# Patient Record
Sex: FEMALE | Race: WHITE | NOT HISPANIC OR LATINO
[De-identification: names, ages, dates, MRNs, and addresses within clinical notes are randomized per-mention and may not be internally consistent; named-entity substitution may affect disease eponyms.]

---

## 2020-12-03 DIAGNOSIS — Z00.00 ENCOUNTER FOR GENERAL ADULT MEDICAL EXAMINATION W/OUT ABNORMAL FINDINGS: ICD-10-CM

## 2020-12-03 DIAGNOSIS — Z01.818 ENCOUNTER FOR OTHER PREPROCEDURAL EXAMINATION: ICD-10-CM

## 2020-12-04 ENCOUNTER — APPOINTMENT (OUTPATIENT)
Dept: DISASTER EMERGENCY | Facility: CLINIC | Age: 85
End: 2020-12-04

## 2020-12-05 LAB — SARS-COV-2 N GENE NPH QL NAA+PROBE: NOT DETECTED

## 2020-12-07 ENCOUNTER — APPOINTMENT (OUTPATIENT)
Dept: PULMONOLOGY | Facility: CLINIC | Age: 85
End: 2020-12-07
Payer: MEDICARE

## 2020-12-07 VITALS
SYSTOLIC BLOOD PRESSURE: 165 MMHG | WEIGHT: 151 LBS | HEART RATE: 58 BPM | DIASTOLIC BLOOD PRESSURE: 64 MMHG | OXYGEN SATURATION: 99 % | BODY MASS INDEX: 28.51 KG/M2 | HEIGHT: 61 IN | TEMPERATURE: 98.2 F

## 2020-12-07 DIAGNOSIS — Z87.39 PERSONAL HISTORY OF OTHER DISEASES OF THE MUSCULOSKELETAL SYSTEM AND CONNECTIVE TISSUE: ICD-10-CM

## 2020-12-07 DIAGNOSIS — M35.3 POLYMYALGIA RHEUMATICA: ICD-10-CM

## 2020-12-07 DIAGNOSIS — K22.70 BARRETT'S ESOPHAGUS W/OUT DYSPLASIA: ICD-10-CM

## 2020-12-07 DIAGNOSIS — Z86.59 PERSONAL HISTORY OF OTHER MENTAL AND BEHAVIORAL DISORDERS: ICD-10-CM

## 2020-12-07 DIAGNOSIS — J30.2 OTHER SEASONAL ALLERGIC RHINITIS: ICD-10-CM

## 2020-12-07 DIAGNOSIS — R05 COUGH: ICD-10-CM

## 2020-12-07 DIAGNOSIS — B02.23 POSTHERPETIC POLYNEUROPATHY: ICD-10-CM

## 2020-12-07 DIAGNOSIS — E11.49 TYPE 2 DIABETES MELLITUS WITH OTHER DIABETIC NEUROLOGICAL COMPLICATION: ICD-10-CM

## 2020-12-07 DIAGNOSIS — Z86.79 PERSONAL HISTORY OF OTHER DISEASES OF THE CIRCULATORY SYSTEM: ICD-10-CM

## 2020-12-07 DIAGNOSIS — M65.28 CALCIFIC TENDINITIS, OTHER SITE: ICD-10-CM

## 2020-12-07 DIAGNOSIS — K31.84 GASTROPARESIS: ICD-10-CM

## 2020-12-07 DIAGNOSIS — I25.10 ATHEROSCLEROTIC HEART DISEASE OF NATIVE CORONARY ARTERY W/OUT ANGINA PECTORIS: ICD-10-CM

## 2020-12-07 DIAGNOSIS — E73.9 LACTOSE INTOLERANCE, UNSPECIFIED: ICD-10-CM

## 2020-12-07 DIAGNOSIS — S06.6X0A TRAUMATIC SUBARACHNOID HEMORRHAGE W/OUT LOSS OF CONSCIOUSNESS, INITIAL ENCOUNTER: ICD-10-CM

## 2020-12-07 DIAGNOSIS — H81.4 VERTIGO OF CENTRAL ORIGIN: ICD-10-CM

## 2020-12-07 DIAGNOSIS — Z86.2 PERSONAL HISTORY OF DISEASES OF THE BLOOD AND BLOOD-FORMING ORGANS AND CERTAIN DISORDERS INVOLVING THE IMMUNE MECHANISM: ICD-10-CM

## 2020-12-07 DIAGNOSIS — R06.02 SHORTNESS OF BREATH: ICD-10-CM

## 2020-12-07 DIAGNOSIS — G51.0 BELL'S PALSY: ICD-10-CM

## 2020-12-07 DIAGNOSIS — E78.5 HYPERLIPIDEMIA, UNSPECIFIED: ICD-10-CM

## 2020-12-07 DIAGNOSIS — K21.9 GASTRO-ESOPHAGEAL REFLUX DISEASE W/OUT ESOPHAGITIS: ICD-10-CM

## 2020-12-07 LAB — POCT - HEMOGLOBIN (HGB), QUANTITATIVE, TRANSCUTANEOUS: 9.3

## 2020-12-07 PROCEDURE — 94010 BREATHING CAPACITY TEST: CPT

## 2020-12-07 PROCEDURE — 99204 OFFICE O/P NEW MOD 45 MIN: CPT | Mod: 25

## 2020-12-07 PROCEDURE — 94727 GAS DIL/WSHOT DETER LNG VOL: CPT

## 2020-12-07 PROCEDURE — 88738 HGB QUANT TRANSCUTANEOUS: CPT

## 2020-12-07 PROCEDURE — ZZZZZ: CPT

## 2020-12-07 PROCEDURE — 94729 DIFFUSING CAPACITY: CPT

## 2020-12-07 RX ORDER — LEVOFLOXACIN 250 MG/1
250 TABLET, FILM COATED ORAL
Qty: 3 | Refills: 0 | Status: COMPLETED | COMMUNITY
Start: 2020-11-23

## 2020-12-07 RX ORDER — DICLOFENAC SODIUM 1% 10 MG/G
1 GEL TOPICAL
Qty: 300 | Refills: 0 | Status: DISCONTINUED | COMMUNITY
Start: 2020-11-11

## 2020-12-07 RX ORDER — LINAGLIPTIN 5 MG/1
5 TABLET, FILM COATED ORAL
Refills: 0 | Status: ACTIVE | COMMUNITY
Start: 2020-12-07

## 2020-12-07 RX ORDER — CLOPIDOGREL BISULFATE 75 MG/1
75 TABLET, FILM COATED ORAL
Refills: 0 | Status: ACTIVE | COMMUNITY
Start: 2020-12-07

## 2020-12-07 RX ORDER — SERTRALINE HYDROCHLORIDE 100 MG/1
100 TABLET, FILM COATED ORAL
Refills: 0 | Status: ACTIVE | COMMUNITY
Start: 2020-12-07

## 2020-12-07 RX ORDER — AMOXICILLIN AND CLAVULANATE POTASSIUM 875; 125 MG/1; MG/1
875-125 TABLET, COATED ORAL
Qty: 28 | Refills: 0 | Status: DISCONTINUED | COMMUNITY
Start: 2020-11-25

## 2020-12-07 RX ORDER — FOLIC ACID 1 MG/1
1 TABLET ORAL
Refills: 0 | Status: ACTIVE | COMMUNITY
Start: 2020-12-07

## 2020-12-07 RX ORDER — ROSUVASTATIN CALCIUM 10 MG/1
10 TABLET, FILM COATED ORAL
Refills: 0 | Status: ACTIVE | COMMUNITY
Start: 2020-12-07

## 2020-12-07 RX ORDER — FAMOTIDINE 20 MG/1
20 TABLET, FILM COATED ORAL
Refills: 0 | Status: ACTIVE | COMMUNITY
Start: 2020-12-07

## 2020-12-07 RX ORDER — OLMESARTAN MEDOXOMIL 5 MG/1
5 TABLET, FILM COATED ORAL
Refills: 0 | Status: ACTIVE | COMMUNITY
Start: 2020-12-07

## 2020-12-07 RX ORDER — TRIAMCINOLONE ACETONIDE 1 MG/G
0.1 CREAM TOPICAL
Qty: 60 | Refills: 0 | Status: DISCONTINUED | COMMUNITY
Start: 2020-08-18

## 2020-12-07 RX ORDER — NEBIVOLOL HYDROCHLORIDE 10 MG/1
10 TABLET ORAL
Refills: 0 | Status: ACTIVE | COMMUNITY
Start: 2020-12-07

## 2020-12-07 RX ORDER — LEVOCETIRIZINE DIHYDROCHLORIDE 5 MG/1
5 TABLET ORAL
Refills: 0 | Status: ACTIVE | COMMUNITY
Start: 2020-12-07

## 2020-12-07 RX ORDER — DENOSUMAB 60 MG/ML
60 INJECTION SUBCUTANEOUS
Refills: 0 | Status: ACTIVE | COMMUNITY
Start: 2020-12-07

## 2020-12-07 NOTE — HISTORY OF PRESENT ILLNESS
[TextBox_4] : Patient referred for evaluation of exertional chest discomfort and shortness of breath.  She has a longstanding history of dyspnea which is worsened over the past few months.  She saw her cardiologist and echocardiogram revealed mild pulmonary hypertension.  She does report some degree of cough and congestion.  She has a history of chronic anemia.  She has been treated for asthma in the past.  She does not report recent cough or wheeze.  She was recently hospitalized for complications of a urinary tract infection.  Chest imaging done during the hospital was negative.  sHe does have a history of obstructive sleep apnea.\par sHe recently had an oral appliance made and is pending a sleep study to assess its effectiveness.\par \par sHe reports frequent heartburn\par \par

## 2020-12-07 NOTE — ASSESSMENT
[FreeTextEntry1] : Shortness of breath appears to be multifactorial.\par Anemia deconditioning recent hospitalization.  Agree with cardiac catheterization as no apparent pulmonary disease.  Need to assess adequacy of sleep apnea treatment.  Will arrange multinight home sleep study for both repeat diagnosis and assessment of oral appliance efficacy.\par Cough likely secondary to acid reflux disease.  Should have reassessment after cardiac catheterization

## 2020-12-07 NOTE — PHYSICAL EXAM
[No Acute Distress] : no acute distress [Normal Appearance] : normal appearance [No Neck Mass] : no neck mass [Normal Rate/Rhythm] : normal rate/rhythm [Normal S1, S2] : normal s1, s2 [No Murmurs] : no murmurs [No Resp Distress] : no resp distress [Clear to Auscultation Bilaterally] : clear to auscultation bilaterally [No Abnormalities] : no abnormalities [Benign] : benign [Normal Gait] : normal gait [No Clubbing] : no clubbing [No Cyanosis] : no cyanosis [No Edema] : no edema [FROM] : FROM [Normal Color/ Pigmentation] : normal color/ pigmentation [No Focal Deficits] : no focal deficits [Oriented x3] : oriented x3 [Normal Affect] : normal affect [TextBox_11] : dry throat elongated uvula

## 2021-01-19 ENCOUNTER — APPOINTMENT (OUTPATIENT)
Dept: PULMONOLOGY | Facility: CLINIC | Age: 86
End: 2021-01-19
Payer: MEDICARE

## 2021-01-19 PROCEDURE — 95800 SLP STDY UNATTENDED: CPT

## 2021-01-21 PROCEDURE — 95800 SLP STDY UNATTENDED: CPT

## 2021-01-22 PROCEDURE — 95800 SLP STDY UNATTENDED: CPT

## 2021-02-23 ENCOUNTER — APPOINTMENT (OUTPATIENT)
Dept: PULMONOLOGY | Facility: CLINIC | Age: 86
End: 2021-02-23
Payer: MEDICARE

## 2021-02-23 VITALS
TEMPERATURE: 98.2 F | HEART RATE: 55 BPM | DIASTOLIC BLOOD PRESSURE: 80 MMHG | SYSTOLIC BLOOD PRESSURE: 170 MMHG | OXYGEN SATURATION: 99 %

## 2021-02-23 DIAGNOSIS — M35.00 SICCA SYNDROME, UNSPECIFIED: ICD-10-CM

## 2021-02-23 PROCEDURE — 99214 OFFICE O/P EST MOD 30 MIN: CPT

## 2021-02-23 NOTE — HISTORY OF PRESENT ILLNESS
[TextBox_4] : Followup for sleep apnea\par Patient here to review results of home sleep study.\par No change in history as reported on initial evaluation\par Currently being treated with Ranexa for pulmonary hypertension shortness of breath.  History of Sjogren's syndrome and rheumatoid arthritis.

## 2021-02-23 NOTE — ASSESSMENT
[FreeTextEntry1] : Results of sleep study reviewed with patient. Treatment options including weight loss oral appliance therapy and PAP therapy were reviewed with patient. After careful review of sleep study patient desire and risk factors recommend initial therapy with PAP. Will order auto titrating device.\par \par It remains to be seen whether treatment of sleep apnea will have any effect on the pulmonary hypertension I suspect that it will not.  I have obtained additional serologies and I discussed the case with the patient's rheumatologist Dr. Lionel Bower.  If there is no decrease in the pulmonary pressures with use of CPAP will consider initiating treatment for autoimmune pulmonary hypertension.

## 2021-02-23 NOTE — PROCEDURE
[FreeTextEntry1] : Home sleep study demonstrates moderate DANK.  The patient informs me that he used an oral appliance for some of the nights of the study however there appears to be no difference with or without the appliance.  In comparison to her prior sleep study dated 2016 there has been a slight increase in the AHI and decrease in the oxygen desaturation index.

## 2021-02-24 LAB
ENA SS-A AB SER IA-ACNC: <0.2 AL
ENA SS-B AB SER IA-ACNC: <0.2 AL
RHEUMATOID FACT SER QL: <10 IU/ML

## 2021-02-25 LAB — ANACR T: NEGATIVE

## 2021-04-06 ENCOUNTER — APPOINTMENT (OUTPATIENT)
Dept: PULMONOLOGY | Facility: CLINIC | Age: 86
End: 2021-04-06

## 2021-04-12 ENCOUNTER — APPOINTMENT (OUTPATIENT)
Dept: PULMONOLOGY | Facility: CLINIC | Age: 86
End: 2021-04-12
Payer: MEDICARE

## 2021-04-12 VITALS
OXYGEN SATURATION: 99 % | TEMPERATURE: 98.7 F | RESPIRATION RATE: 16 BRPM | HEART RATE: 58 BPM | DIASTOLIC BLOOD PRESSURE: 57 MMHG | SYSTOLIC BLOOD PRESSURE: 135 MMHG

## 2021-04-12 PROCEDURE — 94617 EXERCISE TST BRNCSPSM W/ECG: CPT

## 2021-04-12 PROCEDURE — 99214 OFFICE O/P EST MOD 30 MIN: CPT | Mod: 25

## 2021-04-12 NOTE — REASON FOR VISIT
[Follow-Up] : a follow-up visit [Sleep Apnea] : sleep apnea [Pulmonary Hypertension] : pulmonary hypertension

## 2021-04-12 NOTE — HISTORY OF PRESENT ILLNESS
[Never] : never [TextBox_4] : Follow-up for sleep apnea and pulmonary hypertension.\par Currently on auto titrating CPAP has been using it for about a month she notes an improvement in level of dyspnea and a reduction in previously described palpitations with exertion she has no new complaints he denies cough or sputum production.  She reports no difficulty with CPAP use\par \par Here for DANK followup. Reports no current respiratory symptoms or sleep symptoms. Using PAP on a nightly basis without difficulty. Reports no symptoms of daytime sleepiness. Getting supplies regularly. \par \par Device: ResMed S 10 auto\par \par Vendor: [employer]\par \par Settin-15\par \par Mask: P 10 nasal\par \par Issues: None\par

## 2021-04-12 NOTE — ASSESSMENT
[FreeTextEntry1] : Patient is currently on treatment with PAP. Data download confirms excellent compliance. Patient continues to use treatment and is benefiting from it.\par It is yet to be determined if her pulmonary hypertension will improve with CPAP treatment she is due to see her cardiologist next month and would like her to have a follow-up echocardiogram in June.  If pulmonary hypertension does not improve may consider a drug therapy as she also has underlying rheumatoid arthritis and an autoimmune mechanism may be responsible.  I do need to review her cardiac catheterization as she was told that she had diastolic heart failure.  It may be difficult to make the differentiation in this case between pulmonary hypertension related to autoimmune disease sleep apnea and diastolic heart disease

## 2021-04-12 NOTE — PROCEDURE
[FreeTextEntry1] : CPAP usage days 30/30 days (100%)\par >= 4 hours 29 days (97%)\par < 4 hours 1 days (3%)\par Usage hours 223 hours 57 minutes\par Average usage (total days) 7 hours 28 minutes\par Average usage (days used) 7 hours 28 minutes\par Median usage (days used) 7 hours 42 minutes\par Total used hours (value since last reset - 04/11/2021) 285 hours\par AirSense 10 AutoSet\par Serial number [id number]\par Mode AutoSet\par Min Pressure 5 cmH2O\par Max Pressure 15 cmH2O\par EPR Off\par EPR level 1\par Response Standard\par Therapy\par Pressure - cmH2O Median: 10.7 95th percentile: 13.8 Maximum: 14.6\par Leaks - L/min Median: 30.4 95th percentile: 56.8 Maximum: 81.8\par Events per hour AI: 3.5 HI: 1.3 AHI: 4.8\par Apnea Index Central: 0.1 Obstructive: 0.5 Unknown: 2.9\par RERA Index 3.2\par \par Walking oximetry demonstrates no desaturation

## 2021-07-12 ENCOUNTER — APPOINTMENT (OUTPATIENT)
Dept: PULMONOLOGY | Facility: CLINIC | Age: 86
End: 2021-07-12
Payer: MEDICARE

## 2021-07-12 VITALS
DIASTOLIC BLOOD PRESSURE: 80 MMHG | HEART RATE: 56 BPM | OXYGEN SATURATION: 97 % | TEMPERATURE: 98 F | SYSTOLIC BLOOD PRESSURE: 150 MMHG

## 2021-07-12 PROCEDURE — 99214 OFFICE O/P EST MOD 30 MIN: CPT

## 2021-07-12 NOTE — ASSESSMENT
[FreeTextEntry1] : Patient is currently on treatment with PAP. Data download confirms excellent compliance. Patient continues to use treatment and is benefiting from it.\par Should have follow-up echocardiogram in reference to pulmonary hypertension now that she is on CPAP

## 2021-07-12 NOTE — HISTORY OF PRESENT ILLNESS
[TextBox_4] : Follow-up for sleep apnea and pulmonary hypertension.\par Using CPAP on a nightly basis with good clinical response and definite improvement in daytime alertness  describes it as a "miracle".  She reports improved exercise tolerance and improvement in daytime alertness although there are days that she is still sleepy during the daytime I looked at her data and she has some leakage issues and this may be part of the reason that her response has been somewhat uneven.  I talked about replacing her nasal pillow with a different kind of mask overall however she is doing very well.\par \par I reviewed her recent labs which showed elevated parathyroid level otherwise no significant abnormalities.

## 2021-10-22 ENCOUNTER — APPOINTMENT (OUTPATIENT)
Dept: PULMONOLOGY | Facility: CLINIC | Age: 86
End: 2021-10-22
Payer: MEDICARE

## 2021-10-22 VITALS — HEART RATE: 72 BPM | DIASTOLIC BLOOD PRESSURE: 72 MMHG | SYSTOLIC BLOOD PRESSURE: 148 MMHG | OXYGEN SATURATION: 98 %

## 2021-10-22 DIAGNOSIS — I27.20 PULMONARY HYPERTENSION, UNSPECIFIED: ICD-10-CM

## 2021-10-22 DIAGNOSIS — M06.9 RHEUMATOID ARTHRITIS, UNSPECIFIED: ICD-10-CM

## 2021-10-22 PROCEDURE — 99214 OFFICE O/P EST MOD 30 MIN: CPT

## 2021-10-23 PROBLEM — I27.20 PULMONARY HYPERTENSION: Status: ACTIVE | Noted: 2021-02-23

## 2021-10-23 PROBLEM — M06.9 RHEUMATOID ARTHRITIS: Status: ACTIVE | Noted: 2021-02-23

## 2021-10-23 NOTE — PROCEDURE
[FreeTextEntry1] : CPAP compliance data demonstrates excellent compliance nightly usage 6 hours AHI 4.5

## 2021-10-23 NOTE — HISTORY OF PRESENT ILLNESS
[TextBox_4] : Follow-up for sleep apnea and pulmonary hypertension.  History of pulmonary hypertension associated with elevated pulmonary capillary wedge pressure.  Currently on CPAP.  Using on nightly basis.  Breathing overall improved.

## 2021-10-23 NOTE — ASSESSMENT
[FreeTextEntry1] : Patient is currently on treatment with PAP. Data download confirms excellent compliance. Patient continues to use treatment and is benefiting from it.\par History of pulmonary hypertension associated with elevated pulmonary capillary wedge pressure.  Last echocardiogram did not show significant pulmonary hypertension will defer management of this to cardiology.  For now satisfied with her progress.  Going to [place] for the next few months and would reevaluate upon her return

## 2022-09-29 ENCOUNTER — APPOINTMENT (OUTPATIENT)
Dept: ORTHOPEDIC SURGERY | Facility: CLINIC | Age: 87
End: 2022-09-29

## 2022-09-29 VITALS — WEIGHT: 151 LBS | BODY MASS INDEX: 28.51 KG/M2 | HEIGHT: 61 IN

## 2022-09-29 DIAGNOSIS — S80.02XA CONTUSION OF LEFT KNEE, INITIAL ENCOUNTER: ICD-10-CM

## 2022-09-29 DIAGNOSIS — M17.12 UNILATERAL PRIMARY OSTEOARTHRITIS, LEFT KNEE: ICD-10-CM

## 2022-09-29 PROCEDURE — 73564 X-RAY EXAM KNEE 4 OR MORE: CPT | Mod: LT

## 2022-09-29 PROCEDURE — 99213 OFFICE O/P EST LOW 20 MIN: CPT

## 2022-09-29 NOTE — HISTORY OF PRESENT ILLNESS
[Sudden] : sudden [5] : 5 [3] : 3 [Dull/Aching] : dull/aching [Sharp] : sharp [Intermittent] : intermittent [Rest] : rest [Walking] : walking [Stairs] : stairs [] : yes [de-identified] : 87 year old female with pain in the left knee, she fell onto the street on 9/22/22 and landed directly onto her knee, Happened after CSI by rheum.  she tripped on the lip of the curb. She was seen at [facility] ER, underwent Xrays and placed into a knee immobilizer for possible fracture. Pain with walking, stairs, small abrasion anterior knee. Has bruising and abrasion. Tried Tylenol. No fever/chills/calf pain. Had been using a waker before this and has long term h/o OA [FreeTextEntry3] : 9/22/22 [FreeTextEntry5] : pt was walking when she tripped on the edge of the curb. pt hurt the the left knee .pt has been using  a brace in the left knee.  [de-identified] : 9/22/22

## 2022-09-29 NOTE — DISCUSSION/SUMMARY
[de-identified] : Patient allowed to gently start resuming activities.\par Discussed change to medication prescription and usage.\par Bracing options discussed with patient. \par ice\par use walker\par modify activity\par 09/29/2022 \par \par  RE:  LISA SALAZAR \par \par Acct #- [id number] \par \par \par Attention:  Nurse Reviewer /Medical Director\par \par I am writing this letter as a medical necessity for PT program.\par Patient has tried analgesics, non-steroid anti-inflammatory agents, \par hot or cold compresses,injections of corticosteroids, etc)  which in combination or by themselves has not worked.\par Based on my patient's condition, I strongly believe that the PT is medically needed.\par  \par Thank you for your time and consideration.   \par \par

## 2022-09-29 NOTE — PHYSICAL EXAM
[5___] : hamstring 5[unfilled]/5 [] : no calf tenderness [Left] : left knee [All Views] : anteroposterior, lateral, skyline, and anteroposterior standing [advanced tricompartmental OA with medial compartment narrowing and varus alignment] : advanced tricompartmental OA with medial compartment narrowing and varus alignment [FreeTextEntry3] : she is on plavix, bruising anterior aspect of the knee. Small laceration anterior knee, no evidence of infection.  [FreeTextEntry9] : poss small avulsion of inf patella osteophyte [TWNoteComboBox7] : flexion 125 degrees [de-identified] : extension 0 degrees

## 2022-10-07 ENCOUNTER — APPOINTMENT (OUTPATIENT)
Dept: PULMONOLOGY | Facility: CLINIC | Age: 87
End: 2022-10-07

## 2022-10-07 VITALS
HEART RATE: 90 BPM | BODY MASS INDEX: 28.51 KG/M2 | TEMPERATURE: 98.6 F | DIASTOLIC BLOOD PRESSURE: 78 MMHG | OXYGEN SATURATION: 94 % | SYSTOLIC BLOOD PRESSURE: 151 MMHG | HEIGHT: 61 IN | WEIGHT: 151 LBS

## 2022-10-07 PROCEDURE — 71046 X-RAY EXAM CHEST 2 VIEWS: CPT

## 2022-10-07 PROCEDURE — 99213 OFFICE O/P EST LOW 20 MIN: CPT | Mod: 25

## 2022-10-08 NOTE — HISTORY OF PRESENT ILLNESS
[TextBox_4] : Follow-up for sleep apnea.  Continues to use CPAP on a nightly basis.  Notes issues with dryness.  Using auto CPAP with nasal pillow.  Continues to report nonrestorative sleep.  Has been experiencing some issues with shortness of breath.

## 2022-10-08 NOTE — ASSESSMENT
[FreeTextEntry1] : Patient is currently on treatment with PAP. Data download confirms excellent compliance. Patient continues to use treatment and is benefiting from it.\par \par Fitted for [employer] standard interface hopefully will address leak issues also instructed patient how to adjust CPAP humidification

## 2023-03-29 ENCOUNTER — OFFICE (OUTPATIENT)
Dept: URBAN - METROPOLITAN AREA CLINIC 77 | Facility: CLINIC | Age: 88
Setting detail: OPHTHALMOLOGY
End: 2023-03-29
Payer: MEDICARE

## 2023-03-29 DIAGNOSIS — H02.831: ICD-10-CM

## 2023-03-29 DIAGNOSIS — H16.223: ICD-10-CM

## 2023-03-29 DIAGNOSIS — G43.109: ICD-10-CM

## 2023-03-29 DIAGNOSIS — I63.50: ICD-10-CM

## 2023-03-29 DIAGNOSIS — D31.31: ICD-10-CM

## 2023-03-29 DIAGNOSIS — H53.2: ICD-10-CM

## 2023-03-29 DIAGNOSIS — H50.52: ICD-10-CM

## 2023-03-29 DIAGNOSIS — E11.9: ICD-10-CM

## 2023-03-29 DIAGNOSIS — M06.9: ICD-10-CM

## 2023-03-29 DIAGNOSIS — H35.3132: ICD-10-CM

## 2023-03-29 DIAGNOSIS — H02.834: ICD-10-CM

## 2023-03-29 DIAGNOSIS — H26.493: ICD-10-CM

## 2023-03-29 PROBLEM — H54.2X11 LOW VISION BILATERALLY; RIGHT EYE CATEGORY 1 LEFT EYE CATEGORY 1: Status: ACTIVE | Noted: 2023-03-29

## 2023-03-29 PROCEDURE — 92250 FUNDUS PHOTOGRAPHY W/I&R: CPT | Performed by: OPHTHALMOLOGY

## 2023-03-29 PROCEDURE — 92060 SENSORIMOTOR EXAMINATION: CPT | Performed by: OPHTHALMOLOGY

## 2023-03-29 PROCEDURE — 99214 OFFICE O/P EST MOD 30 MIN: CPT | Performed by: OPHTHALMOLOGY

## 2023-03-29 ASSESSMENT — REFRACTION_AUTOREFRACTION
OS_CYLINDER: -2.00
OS_SPHERE: +1.75
OD_AXIS: 111
OD_SPHERE: +1.25
OS_AXIS: 85
OD_CYLINDER: -1.50

## 2023-03-29 ASSESSMENT — REFRACTION_MANIFEST
OD_VA1: 20/25
OD_AXIS: 120
OS_VA1: 20/40
OD_ADD: +3.00
OS_ADD: +3.00
OD_CYLINDER: -1.25
OS_CYLINDER: -1.25
OS_AXIS: 85
OD_SPHERE: +1.00
OS_SPHERE: +1.00

## 2023-03-29 ASSESSMENT — SPHEQUIV_DERIVED
OS_SPHEQUIV: 0.75
OS_SPHEQUIV: 0.375
OD_SPHEQUIV: 0.5
OD_SPHEQUIV: 0.375

## 2023-03-29 ASSESSMENT — CONFRONTATIONAL VISUAL FIELD TEST (CVF)
OS_FINDINGS: FULL
OD_FINDINGS: FULL

## 2023-03-29 ASSESSMENT — AXIALLENGTH_DERIVED
OD_AL: 23.264
OS_AL: 23.1255
OD_AL: 23.3114
OS_AL: 23.2667

## 2023-03-29 ASSESSMENT — KERATOMETRY
OS_K2POWER_DIOPTERS: 45.00
OD_AXISANGLE_DEGREES: 30
OD_K2POWER_DIOPTERS: 44.50
OS_K1POWER_DIOPTERS: 43.00
OS_AXISANGLE_DEGREES: 177
OD_K1POWER_DIOPTERS: 43.25

## 2023-03-29 ASSESSMENT — LID EXAM ASSESSMENTS: OD_DERMATOCHALASIS: 2+

## 2023-03-29 ASSESSMENT — REFRACTION_CURRENTRX
OS_AXIS: 84
OS_CYLINDER: -1.25
OD_OVR_VA: 20/
OD_ADD: +3.50
OS_OVR_VA: 20/
OD_CYLINDER: -1.25
OD_AXIS: 120
OD_SPHERE: +0.75
OS_SPHERE: +0.50
OS_ADD: +3.50

## 2023-03-29 ASSESSMENT — LID POSITION - DERMATOCHALASIS
OS_DERMATOCHALASIS: LUL
OD_DERMATOCHALASIS: RUL

## 2023-03-29 ASSESSMENT — VISUAL ACUITY
OD_BCVA: 20/70
OS_BCVA: 20/50

## 2023-05-30 ENCOUNTER — APPOINTMENT (OUTPATIENT)
Dept: ORTHOPEDIC SURGERY | Facility: CLINIC | Age: 88
End: 2023-05-30
Payer: MEDICARE

## 2023-05-30 VITALS — BODY MASS INDEX: 28.51 KG/M2 | WEIGHT: 151 LBS | HEIGHT: 61 IN

## 2023-05-30 DIAGNOSIS — Z86.79 PERSONAL HISTORY OF OTHER DISEASES OF THE CIRCULATORY SYSTEM: ICD-10-CM

## 2023-05-30 DIAGNOSIS — M70.61 TROCHANTERIC BURSITIS, RIGHT HIP: ICD-10-CM

## 2023-05-30 DIAGNOSIS — S32.030D WEDGE COMPRESSION FRACTURE OF THIRD LUMBAR VERTEBRA, SUBSEQUENT ENCOUNTER FOR FRACTURE WITH ROUTINE HEALING: ICD-10-CM

## 2023-05-30 PROCEDURE — 72170 X-RAY EXAM OF PELVIS: CPT

## 2023-05-30 PROCEDURE — 72110 X-RAY EXAM L-2 SPINE 4/>VWS: CPT

## 2023-05-30 PROCEDURE — 99214 OFFICE O/P EST MOD 30 MIN: CPT

## 2023-05-30 RX ORDER — ACETAMINOPHEN AND CODEINE PHOSPHATE 300; 30 MG/1; MG/1
300-30 TABLET ORAL EVERY 8 HOURS
Qty: 60 | Refills: 0 | Status: ACTIVE | COMMUNITY
Start: 2023-05-30 | End: 1900-01-01

## 2023-05-30 NOTE — PHYSICAL EXAM
[] : patient ambulates without assistive device [Disc space narrowing] : Disc space narrowing [Spondylolithesis] : Spondylolithesis [No spinal deformity, fracture, lytic lesion, or marked single level collapse] : No spinal deformity, fracture, lytic lesion, or marked single level collapse

## 2023-05-30 NOTE — HISTORY OF PRESENT ILLNESS
[Lower back] : lower back [10] : 10 [Dull/Aching] : dull/aching [Radiating] : radiating [Shooting] : shooting [Tingling] : tingling [Constant] : constant [Standing] : standing [Walking] : walking [Stairs] : stairs [Retired] : Work status: retired [de-identified] : 5/30/23:  88 year old female presents today with complaints of bilateral sharp low back pain since april 2023 now with radicular pain in RLE with numbness/tingling and occasional LLE radicular complaints. Patient reports pain is worse with lying down.\par Patient has history of low back pain. No Hx of spine surgery or IDANIA injections. Patient had recently received TPI to low back with only minimal relief. Patient has had cardiac PT recently in house only.\par \par Patient with history of fall in december 2022 with left ecchymosis, rules out for fractures at the time. Had left sided pain which later resolved.\par \par TRIED both methacarbamol and tizanidine both she couldn’t tolerate \par \par HIstory of bursiits\par \par \par PmHx:\par Inflammatory Polyarthropathy, Osteoporosis (on Prolia), RA, Sjogrens\par sick sinus syndrome\par MGUS, CKD stage 3, CHF, DM\par CTS, Pulm HTN, Hyperparathyroid\par SSS, LINQ cardiac monitor/ppm (on plavix), HTN, HLD, ASHD\par Hx of Amarosis FUgax, IBS, GERD, SIBO, Liao's eosphagus, gastroparesis, DANK (CPAP machine)\par Recurring UTIs, Hx of SAH after fall in 2015, Vestibular dysfunction\par Pacemaker paced in mid April \par \par Occupation: Retired SW\par \par Lumbar xrays completed today\par L spine - compression fracture at L3\par AP PELVIS - NO OBVIOUS \par \par old MRI reports:\par MRI T spine 2017 - T6, T7 compression fracture\par MRI L spine 2014 - moderate spinal stenosis L4-5\par MRi L spine 2015 - mild chronic compression deformities T12 and L1\par MRi L spine 2020 - L4-5 moderate/stenosis \par xrays t spine 5/4/17 - T9 fracture\par  [] : Post Surgical Visit: no [FreeTextEntry5] : Patient is here with severe low back pain that radiates into her legs, for the past 2 weeks ago\par H/O intermittent back pain, Treated by her rheumatologist had CSI before no relief

## 2023-05-30 NOTE — DATA REVIEWED
[MRI] : MRI [Thoracic Spine] : thoracic spine [Lumbar Spine] : lumbar spine [Report was reviewed and noted in the chart] : The report was reviewed and noted in the chart [I independently reviewed and interpreted images and report] : I independently reviewed and interpreted images and report

## 2023-05-30 NOTE — DISCUSSION/SUMMARY
[de-identified] : reviewed the case and the multiple reports and studies \par likely acute compression fracture at L3\par complex medication \par avoid heavy lifting \par \par kypho at L3 is discussed \par would need clearnace and to hold the blood thinner \par \par tylenol with codeine \par \par hip bursisitis - she just recently had an injection  too soon for another

## 2023-06-29 ENCOUNTER — OUTPATIENT (OUTPATIENT)
Dept: OUTPATIENT SERVICES | Facility: HOSPITAL | Age: 88
LOS: 1 days | End: 2023-06-29
Payer: MEDICARE

## 2023-06-29 ENCOUNTER — APPOINTMENT (OUTPATIENT)
Dept: CT IMAGING | Facility: IMAGING CENTER | Age: 88
End: 2023-06-29
Payer: MEDICARE

## 2023-06-29 DIAGNOSIS — Z00.8 ENCOUNTER FOR OTHER GENERAL EXAMINATION: ICD-10-CM

## 2023-06-29 PROCEDURE — 74176 CT ABD & PELVIS W/O CONTRAST: CPT | Mod: MH

## 2023-06-29 PROCEDURE — 74176 CT ABD & PELVIS W/O CONTRAST: CPT | Mod: 26,MH

## 2023-07-11 ENCOUNTER — OUTPATIENT (OUTPATIENT)
Dept: OUTPATIENT SERVICES | Facility: HOSPITAL | Age: 88
LOS: 1 days | Discharge: HOME | End: 2023-07-11
Payer: MEDICARE

## 2023-07-11 ENCOUNTER — APPOINTMENT (OUTPATIENT)
Dept: RADIOLOGY | Facility: HOSPITAL | Age: 88
End: 2023-07-11

## 2023-07-11 DIAGNOSIS — R93.3 ABNORMAL FINDINGS ON DIAGNOSTIC IMAGING OF OTHER PARTS OF DIGESTIVE TRACT: ICD-10-CM

## 2023-07-11 PROCEDURE — 74220 X-RAY XM ESOPHAGUS 1CNTRST: CPT | Mod: 26

## 2023-07-21 ENCOUNTER — RESULT CHARGE (OUTPATIENT)
Age: 88
End: 2023-07-21

## 2023-07-21 ENCOUNTER — APPOINTMENT (OUTPATIENT)
Dept: ORTHOPEDIC SURGERY | Facility: CLINIC | Age: 88
End: 2023-07-21
Payer: MEDICARE

## 2023-07-21 DIAGNOSIS — M70.61 TROCHANTERIC BURSITIS, RIGHT HIP: ICD-10-CM

## 2023-07-21 PROCEDURE — J3490M: CUSTOM | Mod: NC

## 2023-07-21 PROCEDURE — 20551 NJX 1 TENDON ORIGIN/INSJ: CPT | Mod: RT

## 2023-07-21 PROCEDURE — 72170 X-RAY EXAM OF PELVIS: CPT

## 2023-07-21 PROCEDURE — 72100 X-RAY EXAM L-S SPINE 2/3 VWS: CPT

## 2023-07-21 PROCEDURE — 99214 OFFICE O/P EST MOD 30 MIN: CPT | Mod: 25

## 2023-07-21 NOTE — PROCEDURE
[Large Joint Injection] : Large joint injection [Right] : of the right [Pain] : pain [Alcohol] : alcohol [Betadine] : betadine [Ethyl Chloride sprayed topically] : ethyl chloride sprayed topically [___ cc    1%] : Lidocaine ~Vcc of 1%  [___ cc    0.25%] : Bupivacaine (Marcaine) ~Vcc of 0.25%  [___ cc    10mg] : Triamcinolone (Kenalog) ~Vcc of 10 mg

## 2023-07-22 NOTE — DISCUSSION/SUMMARY
[de-identified] : reviewed the case and the imaging \par the L3 looks to be doing well \par healing but not healed yet \par discussed it takes time to heal \par cont with walker \par shes in a fib not really a candidate for a spine procedure\par \par injection in the right hip bursa today tolerated well for the hip bursitis

## 2023-07-22 NOTE — HISTORY OF PRESENT ILLNESS
[Lower back] : lower back [10] : 10 [Dull/Aching] : dull/aching [Radiating] : radiating [Shooting] : shooting [Tingling] : tingling [Constant] : constant [Standing] : standing [Walking] : walking [Stairs] : stairs [Retired] : Work status: retired [de-identified] : 5/30/23:  88 year old female presents today with complaints of bilateral sharp low back pain since april 2023 now with radicular pain in RLE with numbness/tingling and occasional LLE radicular complaints. Patient reports pain is worse with lying down.\par Patient has history of low back pain. No Hx of spine surgery or IDANIA injections. Patient had recently received TPI to low back with only minimal relief. Patient has had cardiac PT recently in house only.\par \par Patient with history of fall in december 2022 with left ecchymosis, rules out for fractures at the time. Had left sided pain which later resolved.\par \par TRIED both methacarbamol and tizanidine both she couldn’t tolerate \par \par HIstory of bursiits\par \par \par PmHx:\par Inflammatory Polyarthropathy, Osteoporosis (on Prolia), RA, Sjogrens\par sick sinus syndrome\par MGUS, CKD stage 3, CHF, DM\par CTS, Pulm HTN, Hyperparathyroid\par SSS, LINQ cardiac monitor/ppm (on plavix), HTN, HLD, ASHD\par Hx of Amarosis FUgax, IBS, GERD, SIBO, Liao's eosphagus, gastroparesis, DANK (CPAP machine)\par Recurring UTIs, Hx of SAH after fall in 2015, Vestibular dysfunction\par Pacemaker paced in mid April \par \par Occupation: Retired SW\par \par Lumbar xrays completed today\par L spine - compression fracture at L3\par AP PELVIS - NO OBVIOUS \par \par old MRI reports:\par MRI T spine 2017 - T6, T7 compression fracture\par MRI L spine 2014 - moderate spinal stenosis L4-5\par MRi L spine 2015 - mild chronic compression deformities T12 and L1\par MRi L spine 2020 - L4-5 moderate/stenosis \par xrays t spine 5/4/17 - T9 fracture\par \par 7/21/23: here for fu - plan at "reviewed the case and the multiple reports and studies \par likely acute compression fracture at L3\par complex medication \par avoid heavy lifting \par kypho at L3 is discussed \par would need clearnace and to hold the blood thinner \par tylenol with codeine \par hip bursisitis - she just recently had an injection  too soon for another" - overall had another fall on 7/13/23 -went to  AND HAD CT done and stitches placed in the back of the head \par Has a large bruise over the back of the left thigh/buttock \par \par TYLENOL with codein not really helping - made her too drowsy \par \par Use walker \par \par has pain in the back \par \par XRAYS TODAY:\par AP PELVIS  - NO OBVIOUS FRACTURE \par L spine - no change in the L3 \par \par CT C spine from  er REPORT brought in - negatvie\par CT head - no evidence of bleed\par \par back in a fib - scheduled for watchman insertion in august \par  [] : Post Surgical Visit: no [FreeTextEntry5] : Patient is here to follow up on lumbar spine compression fracture L3 verterbra. Notes slight improvement, but still notices pain with prolonged walking. Fell last week, and went to ER.

## 2023-08-03 ENCOUNTER — OFFICE (OUTPATIENT)
Dept: URBAN - METROPOLITAN AREA CLINIC 77 | Facility: CLINIC | Age: 88
Setting detail: OPHTHALMOLOGY
End: 2023-08-03
Payer: MEDICARE

## 2023-08-03 DIAGNOSIS — H35.3112: ICD-10-CM

## 2023-08-03 DIAGNOSIS — D31.31: ICD-10-CM

## 2023-08-03 DIAGNOSIS — H53.2: ICD-10-CM

## 2023-08-03 DIAGNOSIS — H50.52: ICD-10-CM

## 2023-08-03 DIAGNOSIS — H35.3221: ICD-10-CM

## 2023-08-03 PROCEDURE — 92060 SENSORIMOTOR EXAMINATION: CPT | Performed by: OPHTHALMOLOGY

## 2023-08-03 PROCEDURE — 67028 INJECTION EYE DRUG: CPT | Performed by: OPHTHALMOLOGY

## 2023-08-03 PROCEDURE — 99214 OFFICE O/P EST MOD 30 MIN: CPT | Performed by: OPHTHALMOLOGY

## 2023-08-03 PROCEDURE — 92235 FLUORESCEIN ANGRPH MLTIFRAME: CPT | Performed by: OPHTHALMOLOGY

## 2023-08-03 PROCEDURE — 92250 FUNDUS PHOTOGRAPHY W/I&R: CPT | Performed by: OPHTHALMOLOGY

## 2023-08-03 ASSESSMENT — REFRACTION_MANIFEST
OS_CYLINDER: -1.25
OD_AXIS: 120
OS_ADD: +3.00
OS_VA1: 20/40
OD_CYLINDER: -1.25
OS_SPHERE: +1.00
OD_SPHERE: +1.00
OD_VA1: 20/25
OS_AXIS: 85
OD_ADD: +3.00

## 2023-08-03 ASSESSMENT — REFRACTION_CURRENTRX
OD_CYLINDER: -1.25
OS_AXIS: 84
OS_CYLINDER: -1.25
OD_AXIS: 120
OS_SPHERE: +0.50
OD_ADD: +3.50
OD_SPHERE: +0.75
OD_OVR_VA: 20/
OS_ADD: +3.50
OS_OVR_VA: 20/

## 2023-08-03 ASSESSMENT — VISUAL ACUITY
OD_BCVA: 20/70
OS_BCVA: 20/50

## 2023-08-03 ASSESSMENT — REFRACTION_AUTOREFRACTION
OS_CYLINDER: -2.00
OS_SPHERE: +1.75
OD_AXIS: 111
OD_CYLINDER: -1.50
OD_SPHERE: +1.25
OS_AXIS: 85

## 2023-08-03 ASSESSMENT — AXIALLENGTH_DERIVED
OD_AL: 23.264
OS_AL: 23.1255
OD_AL: 23.3114
OS_AL: 23.2667

## 2023-08-03 ASSESSMENT — CONFRONTATIONAL VISUAL FIELD TEST (CVF)
OD_FINDINGS: FULL
OS_FINDINGS: FULL

## 2023-08-03 ASSESSMENT — LID EXAM ASSESSMENTS: OD_DERMATOCHALASIS: 2+

## 2023-08-03 ASSESSMENT — SPHEQUIV_DERIVED
OS_SPHEQUIV: 0.75
OS_SPHEQUIV: 0.375
OD_SPHEQUIV: 0.375
OD_SPHEQUIV: 0.5

## 2023-08-03 ASSESSMENT — KERATOMETRY
OS_K1POWER_DIOPTERS: 43.00
OS_K2POWER_DIOPTERS: 45.00
OD_K2POWER_DIOPTERS: 44.50
OS_AXISANGLE_DEGREES: 177
OD_AXISANGLE_DEGREES: 30
OD_K1POWER_DIOPTERS: 43.25

## 2023-08-03 ASSESSMENT — LID POSITION - DERMATOCHALASIS
OD_DERMATOCHALASIS: RUL
OS_DERMATOCHALASIS: LUL

## 2023-10-09 ENCOUNTER — OFFICE (OUTPATIENT)
Dept: URBAN - METROPOLITAN AREA CLINIC 77 | Facility: CLINIC | Age: 88
Setting detail: OPHTHALMOLOGY
End: 2023-10-09
Payer: MEDICARE

## 2023-10-09 DIAGNOSIS — H35.3221: ICD-10-CM

## 2023-10-09 DIAGNOSIS — D31.31: ICD-10-CM

## 2023-10-09 DIAGNOSIS — H26.493: ICD-10-CM

## 2023-10-09 PROCEDURE — 67028 INJECTION EYE DRUG: CPT | Mod: LT | Performed by: OPHTHALMOLOGY

## 2023-10-09 PROCEDURE — 99213 OFFICE O/P EST LOW 20 MIN: CPT | Mod: 25 | Performed by: OPHTHALMOLOGY

## 2023-10-09 PROCEDURE — 92250 FUNDUS PHOTOGRAPHY W/I&R: CPT | Performed by: OPHTHALMOLOGY

## 2023-10-17 ENCOUNTER — APPOINTMENT (OUTPATIENT)
Dept: PULMONOLOGY | Facility: CLINIC | Age: 88
End: 2023-10-17
Payer: MEDICARE

## 2023-10-17 VITALS
HEART RATE: 60 BPM | RESPIRATION RATE: 16 BRPM | OXYGEN SATURATION: 98 % | SYSTOLIC BLOOD PRESSURE: 134 MMHG | DIASTOLIC BLOOD PRESSURE: 75 MMHG

## 2023-10-17 DIAGNOSIS — G47.33 OBSTRUCTIVE SLEEP APNEA (ADULT) (PEDIATRIC): ICD-10-CM

## 2023-10-17 PROCEDURE — 94762 N-INVAS EAR/PLS OXIMTRY CONT: CPT

## 2023-10-17 PROCEDURE — 99213 OFFICE O/P EST LOW 20 MIN: CPT

## 2023-10-18 ENCOUNTER — NON-APPOINTMENT (OUTPATIENT)
Age: 88
End: 2023-10-18

## 2023-10-19 PROBLEM — G47.33 OSA (OBSTRUCTIVE SLEEP APNEA): Status: ACTIVE | Noted: 2020-12-07

## 2023-10-19 ASSESSMENT — AXIALLENGTH_DERIVED
OD_AL: 23.264
OD_AL: 23.3114
OS_AL: 23.2667
OS_AL: 23.1255

## 2023-10-19 ASSESSMENT — REFRACTION_MANIFEST
OS_AXIS: 85
OS_VA1: 20/40
OD_ADD: +3.00
OD_CYLINDER: -1.25
OS_ADD: +3.00
OS_CYLINDER: -1.25
OD_VA1: 20/25
OD_AXIS: 120
OS_SPHERE: +1.00
OD_SPHERE: +1.00

## 2023-10-19 ASSESSMENT — KERATOMETRY
OS_K2POWER_DIOPTERS: 45.00
OS_K1POWER_DIOPTERS: 43.00
OD_K2POWER_DIOPTERS: 44.50
OD_AXISANGLE_DEGREES: 30
OS_AXISANGLE_DEGREES: 177
OD_K1POWER_DIOPTERS: 43.25

## 2023-10-19 ASSESSMENT — SPHEQUIV_DERIVED
OS_SPHEQUIV: 0.375
OD_SPHEQUIV: 0.375
OS_SPHEQUIV: 0.75
OD_SPHEQUIV: 0.5

## 2023-10-19 ASSESSMENT — VISUAL ACUITY
OD_BCVA: 20/100-3
OS_BCVA: 20/40

## 2023-10-19 ASSESSMENT — LID POSITION - DERMATOCHALASIS
OS_DERMATOCHALASIS: LUL
OD_DERMATOCHALASIS: RUL

## 2023-10-19 ASSESSMENT — REFRACTION_CURRENTRX
OD_OVR_VA: 20/
OS_CYLINDER: -1.25
OS_AXIS: 84
OD_ADD: +3.50
OS_SPHERE: +0.50
OD_CYLINDER: -1.25
OS_ADD: +3.50
OD_AXIS: 120
OD_SPHERE: +0.75
OS_OVR_VA: 20/

## 2023-10-19 ASSESSMENT — REFRACTION_AUTOREFRACTION
OD_CYLINDER: -1.50
OS_SPHERE: +1.75
OD_AXIS: 111
OD_SPHERE: +1.25
OS_AXIS: 85
OS_CYLINDER: -2.00

## 2023-10-19 ASSESSMENT — LID EXAM ASSESSMENTS: OD_DERMATOCHALASIS: 2+

## 2023-11-16 ENCOUNTER — OFFICE (OUTPATIENT)
Dept: URBAN - METROPOLITAN AREA CLINIC 77 | Facility: CLINIC | Age: 88
Setting detail: OPHTHALMOLOGY
End: 2023-11-16
Payer: MEDICARE

## 2023-11-16 DIAGNOSIS — H35.3112: ICD-10-CM

## 2023-11-16 DIAGNOSIS — E11.9: ICD-10-CM

## 2023-11-16 DIAGNOSIS — H26.493: ICD-10-CM

## 2023-11-16 DIAGNOSIS — H35.3221: ICD-10-CM

## 2023-11-16 PROCEDURE — 99213 OFFICE O/P EST LOW 20 MIN: CPT | Mod: 25 | Performed by: OPHTHALMOLOGY

## 2023-11-16 PROCEDURE — 67028 INJECTION EYE DRUG: CPT | Mod: LT | Performed by: OPHTHALMOLOGY

## 2023-11-16 PROCEDURE — 92250 FUNDUS PHOTOGRAPHY W/I&R: CPT | Performed by: OPHTHALMOLOGY

## 2023-11-16 ASSESSMENT — REFRACTION_MANIFEST
OD_VA1: 20/25
OD_ADD: +3.00
OS_VA1: 20/40
OS_AXIS: 85
OS_ADD: +3.00
OD_AXIS: 120
OD_SPHERE: +1.00
OS_CYLINDER: -1.25
OD_CYLINDER: -1.25
OS_SPHERE: +1.00

## 2023-11-16 ASSESSMENT — REFRACTION_AUTOREFRACTION
OD_AXIS: 111
OS_CYLINDER: -2.00
OS_SPHERE: +1.75
OD_SPHERE: +1.25
OS_AXIS: 85
OD_CYLINDER: -1.50

## 2023-11-16 ASSESSMENT — SPHEQUIV_DERIVED
OS_SPHEQUIV: 0.375
OD_SPHEQUIV: 0.375
OS_SPHEQUIV: 0.75
OD_SPHEQUIV: 0.5

## 2023-11-16 ASSESSMENT — REFRACTION_CURRENTRX
OS_AXIS: 84
OS_CYLINDER: -1.25
OD_CYLINDER: -1.25
OS_SPHERE: +0.50
OS_ADD: +3.50
OD_AXIS: 120
OD_ADD: +3.50
OD_SPHERE: +0.75
OD_OVR_VA: 20/
OS_OVR_VA: 20/

## 2023-11-16 ASSESSMENT — LID EXAM ASSESSMENTS: OD_DERMATOCHALASIS: 2+

## 2023-11-16 ASSESSMENT — LID POSITION - DERMATOCHALASIS
OS_DERMATOCHALASIS: LUL
OD_DERMATOCHALASIS: RUL

## 2023-11-16 ASSESSMENT — CONFRONTATIONAL VISUAL FIELD TEST (CVF)
OS_FINDINGS: FULL
OD_FINDINGS: FULL

## 2024-02-23 ENCOUNTER — OFFICE (OUTPATIENT)
Dept: URBAN - METROPOLITAN AREA CLINIC 77 | Facility: CLINIC | Age: 89
Setting detail: OPHTHALMOLOGY
End: 2024-02-23
Payer: MEDICARE

## 2024-02-23 DIAGNOSIS — H26.493: ICD-10-CM

## 2024-02-23 DIAGNOSIS — H35.3221: ICD-10-CM

## 2024-02-23 DIAGNOSIS — H50.52: ICD-10-CM

## 2024-02-23 DIAGNOSIS — D31.31: ICD-10-CM

## 2024-02-23 DIAGNOSIS — H02.831: ICD-10-CM

## 2024-02-23 PROCEDURE — 67028 INJECTION EYE DRUG: CPT | Mod: LT | Performed by: OPHTHALMOLOGY

## 2024-02-23 PROCEDURE — 99213 OFFICE O/P EST LOW 20 MIN: CPT | Mod: 25 | Performed by: OPHTHALMOLOGY

## 2024-02-23 PROCEDURE — 92250 FUNDUS PHOTOGRAPHY W/I&R: CPT | Performed by: OPHTHALMOLOGY

## 2024-02-23 PROCEDURE — 92060 SENSORIMOTOR EXAMINATION: CPT | Performed by: OPHTHALMOLOGY

## 2024-02-23 ASSESSMENT — REFRACTION_AUTOREFRACTION
OS_SPHERE: +1.75
OD_AXIS: 111
OS_AXIS: 85
OS_CYLINDER: -2.00
OD_SPHERE: +1.25
OD_CYLINDER: -1.50

## 2024-02-23 ASSESSMENT — LID POSITION - DERMATOCHALASIS
OS_DERMATOCHALASIS: LUL
OD_DERMATOCHALASIS: RUL

## 2024-02-23 ASSESSMENT — REFRACTION_MANIFEST
OS_AXIS: 85
OS_VA1: 20/40
OS_SPHERE: +1.00
OD_VA1: 20/25
OD_AXIS: 120
OD_SPHERE: +1.00
OD_CYLINDER: -1.25
OD_ADD: +3.00
OS_ADD: +3.00
OS_CYLINDER: -1.25

## 2024-02-23 ASSESSMENT — REFRACTION_CURRENTRX
OD_SPHERE: +0.75
OD_OVR_VA: 20/
OS_CYLINDER: -1.25
OD_ADD: +3.50
OD_AXIS: 120
OS_OVR_VA: 20/
OS_SPHERE: +0.50
OS_ADD: +3.50
OD_CYLINDER: -1.25
OS_AXIS: 84

## 2024-02-23 ASSESSMENT — LID EXAM ASSESSMENTS: OD_DERMATOCHALASIS: 2+

## 2024-02-23 ASSESSMENT — SPHEQUIV_DERIVED
OS_SPHEQUIV: 0.375
OD_SPHEQUIV: 0.5
OS_SPHEQUIV: 0.75
OD_SPHEQUIV: 0.375

## 2024-02-23 ASSESSMENT — CONFRONTATIONAL VISUAL FIELD TEST (CVF)
OD_FINDINGS: FULL
OS_FINDINGS: FULL

## 2024-03-22 ENCOUNTER — OUTPATIENT (OUTPATIENT)
Dept: OUTPATIENT SERVICES | Facility: HOSPITAL | Age: 89
LOS: 1 days | End: 2024-03-22
Payer: MEDICARE

## 2024-03-22 ENCOUNTER — APPOINTMENT (OUTPATIENT)
Dept: RADIOLOGY | Facility: HOSPITAL | Age: 89
End: 2024-03-22
Payer: MEDICARE

## 2024-03-22 DIAGNOSIS — Z00.00 ENCOUNTER FOR GENERAL ADULT MEDICAL EXAMINATION WITHOUT ABNORMAL FINDINGS: ICD-10-CM

## 2024-03-22 DIAGNOSIS — R13.10 DYSPHAGIA, UNSPECIFIED: ICD-10-CM

## 2024-03-22 PROCEDURE — 74220 X-RAY XM ESOPHAGUS 1CNTRST: CPT | Mod: 26

## 2024-03-22 PROCEDURE — 74220 X-RAY XM ESOPHAGUS 1CNTRST: CPT

## 2024-05-22 ENCOUNTER — OFFICE (OUTPATIENT)
Dept: URBAN - METROPOLITAN AREA CLINIC 77 | Facility: CLINIC | Age: 89
Setting detail: OPHTHALMOLOGY
End: 2024-05-22
Payer: MEDICARE

## 2024-05-22 DIAGNOSIS — H02.831: ICD-10-CM

## 2024-05-22 DIAGNOSIS — H35.3221: ICD-10-CM

## 2024-05-22 DIAGNOSIS — H26.493: ICD-10-CM

## 2024-05-22 DIAGNOSIS — D31.31: ICD-10-CM

## 2024-05-22 PROBLEM — H57.12 PAIN IN/OR AROUND EYES; LEFT EYE: Status: ACTIVE | Noted: 2024-05-22

## 2024-05-22 PROCEDURE — 99213 OFFICE O/P EST LOW 20 MIN: CPT | Mod: 25 | Performed by: OPHTHALMOLOGY

## 2024-05-22 PROCEDURE — 67028 INJECTION EYE DRUG: CPT | Mod: LT | Performed by: OPHTHALMOLOGY

## 2024-05-22 PROCEDURE — 92134 CPTRZ OPH DX IMG PST SGM RTA: CPT | Performed by: OPHTHALMOLOGY

## 2024-05-22 ASSESSMENT — LID EXAM ASSESSMENTS: OD_DERMATOCHALASIS: 2+

## 2024-05-22 ASSESSMENT — LID POSITION - DERMATOCHALASIS
OD_DERMATOCHALASIS: RUL
OS_DERMATOCHALASIS: LUL

## 2024-08-05 ENCOUNTER — OFFICE (OUTPATIENT)
Dept: URBAN - METROPOLITAN AREA CLINIC 77 | Facility: CLINIC | Age: 89
Setting detail: OPHTHALMOLOGY
End: 2024-08-05
Payer: MEDICARE

## 2024-08-05 ENCOUNTER — RX ONLY (RX ONLY)
Age: 89
End: 2024-08-05

## 2024-08-05 DIAGNOSIS — H35.3221: ICD-10-CM

## 2024-08-05 DIAGNOSIS — D31.31: ICD-10-CM

## 2024-08-05 DIAGNOSIS — H02.831: ICD-10-CM

## 2024-08-05 PROBLEM — H53.10 SUBJECTIVE VISUAL DISTRUBANCES: Status: ACTIVE | Noted: 2024-08-05

## 2024-08-05 PROCEDURE — 92250 FUNDUS PHOTOGRAPHY W/I&R: CPT | Performed by: OPHTHALMOLOGY

## 2024-08-05 PROCEDURE — 67028 INJECTION EYE DRUG: CPT | Mod: LT | Performed by: OPHTHALMOLOGY

## 2024-08-05 PROCEDURE — 99213 OFFICE O/P EST LOW 20 MIN: CPT | Mod: 25 | Performed by: OPHTHALMOLOGY

## 2024-08-05 ASSESSMENT — LID EXAM ASSESSMENTS: OD_DERMATOCHALASIS: 2+

## 2024-08-05 ASSESSMENT — LID POSITION - DERMATOCHALASIS
OD_DERMATOCHALASIS: RUL
OS_DERMATOCHALASIS: LUL

## 2024-08-05 ASSESSMENT — CONFRONTATIONAL VISUAL FIELD TEST (CVF)
OS_FINDINGS: FULL
OD_FINDINGS: FULL

## 2024-09-26 ENCOUNTER — OFFICE (OUTPATIENT)
Dept: URBAN - METROPOLITAN AREA CLINIC 77 | Facility: CLINIC | Age: 89
Setting detail: OPHTHALMOLOGY
End: 2024-09-26
Payer: MEDICARE

## 2024-09-26 DIAGNOSIS — H26.493: ICD-10-CM

## 2024-09-26 DIAGNOSIS — H35.3221: ICD-10-CM

## 2024-09-26 DIAGNOSIS — H35.3112: ICD-10-CM

## 2024-09-26 DIAGNOSIS — D31.31: ICD-10-CM

## 2024-09-26 DIAGNOSIS — H53.10: ICD-10-CM

## 2024-09-26 PROCEDURE — 67028 INJECTION EYE DRUG: CPT | Mod: LT | Performed by: OPHTHALMOLOGY

## 2024-09-26 PROCEDURE — 92250 FUNDUS PHOTOGRAPHY W/I&R: CPT | Performed by: OPHTHALMOLOGY

## 2024-09-26 PROCEDURE — 99213 OFFICE O/P EST LOW 20 MIN: CPT | Mod: 25 | Performed by: OPHTHALMOLOGY

## 2024-09-26 ASSESSMENT — LID POSITION - DERMATOCHALASIS
OD_DERMATOCHALASIS: RUL
OS_DERMATOCHALASIS: LUL

## 2024-09-26 ASSESSMENT — CONFRONTATIONAL VISUAL FIELD TEST (CVF)
OD_FINDINGS: FULL
OS_FINDINGS: FULL

## 2024-09-26 ASSESSMENT — LID EXAM ASSESSMENTS: OD_DERMATOCHALASIS: 2+

## 2024-11-27 ENCOUNTER — OFFICE (OUTPATIENT)
Dept: URBAN - METROPOLITAN AREA CLINIC 77 | Facility: CLINIC | Age: 89
Setting detail: OPHTHALMOLOGY
End: 2024-11-27
Payer: MEDICARE

## 2024-11-27 DIAGNOSIS — H35.3221: ICD-10-CM

## 2024-11-27 DIAGNOSIS — H26.493: ICD-10-CM

## 2024-11-27 DIAGNOSIS — H53.10: ICD-10-CM

## 2024-11-27 DIAGNOSIS — H35.3112: ICD-10-CM

## 2024-11-27 DIAGNOSIS — H50.52: ICD-10-CM

## 2024-11-27 DIAGNOSIS — H02.834: ICD-10-CM

## 2024-11-27 DIAGNOSIS — H02.831: ICD-10-CM

## 2024-11-27 DIAGNOSIS — D31.31: ICD-10-CM

## 2024-11-27 DIAGNOSIS — E11.9: ICD-10-CM

## 2024-11-27 DIAGNOSIS — G43.109: ICD-10-CM

## 2024-11-27 DIAGNOSIS — H57.12: ICD-10-CM

## 2024-11-27 DIAGNOSIS — H16.223: ICD-10-CM

## 2024-11-27 PROBLEM — S05.02XA CORNEAL ABRASION; INITIAL ENCOUNTER: Status: ACTIVE | Noted: 2024-11-27

## 2024-11-27 PROBLEM — H18.523 EPITHELIAL JUVENILE CORNEAL DYSTROPHY; BOTH EYES: Status: ACTIVE | Noted: 2024-11-27

## 2024-11-27 PROCEDURE — 99214 OFFICE O/P EST MOD 30 MIN: CPT | Performed by: OPHTHALMOLOGY

## 2024-11-27 PROCEDURE — 92202 OPSCPY EXTND ON/MAC DRAW: CPT | Performed by: OPHTHALMOLOGY

## 2024-11-27 PROCEDURE — 92134 CPTRZ OPH DX IMG PST SGM RTA: CPT | Performed by: OPHTHALMOLOGY

## 2024-11-27 ASSESSMENT — CONFRONTATIONAL VISUAL FIELD TEST (CVF)
OS_FINDINGS: FULL
OD_FINDINGS: FULL

## 2024-11-27 ASSESSMENT — LID POSITION - DERMATOCHALASIS
OD_DERMATOCHALASIS: RUL
OS_DERMATOCHALASIS: LUL

## 2024-11-27 ASSESSMENT — LID EXAM ASSESSMENTS: OD_DERMATOCHALASIS: 2+

## 2024-11-27 ASSESSMENT — CORNEAL TRAUMA - ABRASION: OS_ABRASION: PRESENT

## 2024-11-29 ASSESSMENT — REFRACTION_AUTOREFRACTION
OS_SPHERE: +1.75
OD_CYLINDER: -1.50
OD_AXIS: 111
OD_SPHERE: +1.25
OS_AXIS: 85
OS_CYLINDER: -2.00

## 2024-11-29 ASSESSMENT — REFRACTION_MANIFEST
OD_ADD: +3.00
OS_VA1: 20/40
OS_CYLINDER: -1.25
OD_CYLINDER: -1.25
OS_SPHERE: +1.00
OD_VA1: 20/25
OD_AXIS: 120
OD_SPHERE: +1.00
OS_ADD: +3.00
OS_AXIS: 85

## 2024-11-29 ASSESSMENT — REFRACTION_CURRENTRX
OS_CYLINDER: -1.25
OS_ADD: +3.50
OD_ADD: +3.50
OD_AXIS: 120
OS_SPHERE: +0.50
OS_OVR_VA: 20/
OD_CYLINDER: -1.25
OD_OVR_VA: 20/
OS_AXIS: 84
OD_SPHERE: +0.75

## 2024-11-29 ASSESSMENT — KERATOMETRY
OS_K2POWER_DIOPTERS: 45.00
OD_K2POWER_DIOPTERS: 44.50
OS_AXISANGLE_DEGREES: 177
OD_AXISANGLE_DEGREES: 30
OS_K1POWER_DIOPTERS: 43.00
OD_K1POWER_DIOPTERS: 43.25

## 2024-11-29 ASSESSMENT — VISUAL ACUITY
OD_BCVA: 20/100-
OS_BCVA: 20/60

## 2024-12-05 ENCOUNTER — OFFICE (OUTPATIENT)
Dept: URBAN - METROPOLITAN AREA CLINIC 77 | Facility: CLINIC | Age: 89
Setting detail: OPHTHALMOLOGY
End: 2024-12-05
Payer: MEDICARE

## 2024-12-05 DIAGNOSIS — H26.493: ICD-10-CM

## 2024-12-05 DIAGNOSIS — H35.3221: ICD-10-CM

## 2024-12-05 PROBLEM — S05.02XD CORNEAL ABRASION; SUBSEQUENT ENCOUNTER: Status: ACTIVE | Noted: 2024-12-05

## 2024-12-05 PROCEDURE — 67028 INJECTION EYE DRUG: CPT | Mod: LT | Performed by: OPHTHALMOLOGY

## 2024-12-05 PROCEDURE — 99213 OFFICE O/P EST LOW 20 MIN: CPT | Mod: 25 | Performed by: OPHTHALMOLOGY

## 2024-12-05 ASSESSMENT — LID EXAM ASSESSMENTS: OD_DERMATOCHALASIS: 2+

## 2024-12-05 ASSESSMENT — REFRACTION_AUTOREFRACTION
OD_AXIS: 111
OS_AXIS: 85
OS_CYLINDER: -2.00
OD_CYLINDER: -1.50
OS_SPHERE: +1.75
OD_SPHERE: +1.25

## 2024-12-05 ASSESSMENT — REFRACTION_MANIFEST
OS_ADD: +3.00
OD_VA1: 20/25
OD_CYLINDER: -1.25
OS_SPHERE: +1.00
OD_SPHERE: +1.00
OD_AXIS: 120
OS_VA1: 20/40
OD_ADD: +3.00
OS_CYLINDER: -1.25
OS_AXIS: 85

## 2024-12-05 ASSESSMENT — KERATOMETRY
OS_K2POWER_DIOPTERS: 45.00
OD_K1POWER_DIOPTERS: 43.25
OS_AXISANGLE_DEGREES: 177
OD_AXISANGLE_DEGREES: 30
OS_K1POWER_DIOPTERS: 43.00
OD_K2POWER_DIOPTERS: 44.50

## 2024-12-05 ASSESSMENT — VISUAL ACUITY: OS_BCVA: 20/60-

## 2024-12-05 ASSESSMENT — LID POSITION - DERMATOCHALASIS
OD_DERMATOCHALASIS: RUL
OS_DERMATOCHALASIS: LUL

## 2024-12-05 ASSESSMENT — CONFRONTATIONAL VISUAL FIELD TEST (CVF)
OS_FINDINGS: FULL
OD_FINDINGS: FULL

## 2024-12-05 ASSESSMENT — REFRACTION_CURRENTRX
OS_AXIS: 84
OD_ADD: +3.50
OS_CYLINDER: -1.25
OD_OVR_VA: 20/
OS_ADD: +3.50
OD_CYLINDER: -1.25
OD_AXIS: 120
OS_SPHERE: +0.50
OS_OVR_VA: 20/
OD_SPHERE: +0.75

## 2024-12-05 ASSESSMENT — CORNEAL TRAUMA - ABRASION: OS_ABRASION: PRESENT

## 2025-01-30 ENCOUNTER — OFFICE (OUTPATIENT)
Dept: URBAN - METROPOLITAN AREA CLINIC 77 | Facility: CLINIC | Age: OVER 89
Setting detail: OPHTHALMOLOGY
End: 2025-01-30
Payer: MEDICARE

## 2025-01-30 DIAGNOSIS — H35.3221: ICD-10-CM

## 2025-01-30 DIAGNOSIS — D31.31: ICD-10-CM

## 2025-01-30 DIAGNOSIS — H18.523: ICD-10-CM

## 2025-01-30 PROCEDURE — 99213 OFFICE O/P EST LOW 20 MIN: CPT | Mod: 25 | Performed by: OPHTHALMOLOGY

## 2025-01-30 PROCEDURE — 67028 INJECTION EYE DRUG: CPT | Mod: LT | Performed by: OPHTHALMOLOGY

## 2025-01-30 PROCEDURE — 92134 CPTRZ OPH DX IMG PST SGM RTA: CPT | Performed by: OPHTHALMOLOGY

## 2025-02-01 ASSESSMENT — REFRACTION_AUTOREFRACTION
OD_SPHERE: +1.25
OS_CYLINDER: -2.00
OD_CYLINDER: -1.50
OS_SPHERE: +1.75
OD_AXIS: 111
OS_AXIS: 85

## 2025-02-01 ASSESSMENT — REFRACTION_MANIFEST
OS_VA1: 20/40
OD_AXIS: 120
OS_SPHERE: +1.00
OD_ADD: +3.00
OD_VA1: 20/25
OS_CYLINDER: -1.25
OS_AXIS: 85
OD_CYLINDER: -1.25
OS_ADD: +3.00
OD_SPHERE: +1.00

## 2025-02-01 ASSESSMENT — REFRACTION_CURRENTRX
OS_OVR_VA: 20/
OS_CYLINDER: -1.25
OD_CYLINDER: -1.25
OD_OVR_VA: 20/
OD_SPHERE: +0.75
OD_ADD: +3.50
OD_AXIS: 120
OS_AXIS: 84
OS_ADD: +3.50
OS_SPHERE: +0.50

## 2025-02-01 ASSESSMENT — LID POSITION - DERMATOCHALASIS
OD_DERMATOCHALASIS: RUL
OS_DERMATOCHALASIS: LUL

## 2025-02-01 ASSESSMENT — CORNEAL TRAUMA - ABRASION: OS_ABRASION: PRESENT

## 2025-02-01 ASSESSMENT — KERATOMETRY
OS_K1POWER_DIOPTERS: 43.00
OS_K2POWER_DIOPTERS: 45.00
OD_K1POWER_DIOPTERS: 43.25
OD_AXISANGLE_DEGREES: 30
OS_AXISANGLE_DEGREES: 177
OD_K2POWER_DIOPTERS: 44.50

## 2025-02-01 ASSESSMENT — VISUAL ACUITY
OS_BCVA: 20/70+
OD_BCVA: 20/80+

## 2025-02-01 ASSESSMENT — LID EXAM ASSESSMENTS: OD_DERMATOCHALASIS: 2+

## 2025-05-05 ENCOUNTER — OFFICE (OUTPATIENT)
Dept: URBAN - METROPOLITAN AREA CLINIC 77 | Facility: CLINIC | Age: OVER 89
Setting detail: OPHTHALMOLOGY
End: 2025-05-05
Payer: MEDICARE

## 2025-05-05 DIAGNOSIS — H35.3221: ICD-10-CM

## 2025-05-05 DIAGNOSIS — H26.493: ICD-10-CM

## 2025-05-05 DIAGNOSIS — D31.31: ICD-10-CM

## 2025-05-05 PROCEDURE — 99213 OFFICE O/P EST LOW 20 MIN: CPT | Mod: 25 | Performed by: OPHTHALMOLOGY

## 2025-05-05 PROCEDURE — 92134 CPTRZ OPH DX IMG PST SGM RTA: CPT | Performed by: OPHTHALMOLOGY

## 2025-05-05 PROCEDURE — 67028 INJECTION EYE DRUG: CPT | Mod: LT | Performed by: OPHTHALMOLOGY

## 2025-05-05 ASSESSMENT — KERATOMETRY
OD_AXISANGLE_DEGREES: 30
OS_K2POWER_DIOPTERS: 45.00
OS_K1POWER_DIOPTERS: 43.00
OD_K1POWER_DIOPTERS: 43.25
OD_K2POWER_DIOPTERS: 44.50
OS_AXISANGLE_DEGREES: 177

## 2025-05-05 ASSESSMENT — REFRACTION_MANIFEST
OS_ADD: +3.00
OD_SPHERE: +1.00
OS_VA1: 20/40
OS_SPHERE: +1.00
OD_CYLINDER: -1.25
OS_AXIS: 85
OD_AXIS: 120
OD_ADD: +3.00
OD_VA1: 20/25
OS_CYLINDER: -1.25

## 2025-05-05 ASSESSMENT — REFRACTION_CURRENTRX
OD_ADD: +3.50
OD_CYLINDER: -1.25
OS_AXIS: 84
OS_ADD: +3.50
OD_SPHERE: +0.75
OS_CYLINDER: -1.25
OD_AXIS: 120
OS_SPHERE: +0.50
OD_OVR_VA: 20/
OS_OVR_VA: 20/

## 2025-05-05 ASSESSMENT — TONOMETRY
OD_IOP_MMHG: 16
OS_IOP_MMHG: 18

## 2025-05-05 ASSESSMENT — REFRACTION_AUTOREFRACTION
OS_CYLINDER: -2.00
OS_SPHERE: +1.75
OD_AXIS: 111
OS_AXIS: 85
OD_SPHERE: +1.25
OD_CYLINDER: -1.50

## 2025-05-05 ASSESSMENT — CORNEAL TRAUMA - ABRASION: OS_ABRASION: PRESENT

## 2025-05-05 ASSESSMENT — CONFRONTATIONAL VISUAL FIELD TEST (CVF)
OD_FINDINGS: FULL
OS_FINDINGS: FULL

## 2025-05-05 ASSESSMENT — VISUAL ACUITY
OS_BCVA: 20/50
OD_BCVA: 20/80-

## 2025-05-05 ASSESSMENT — LID POSITION - DERMATOCHALASIS
OD_DERMATOCHALASIS: RUL
OS_DERMATOCHALASIS: LUL

## 2025-05-05 ASSESSMENT — LID EXAM ASSESSMENTS: OD_DERMATOCHALASIS: 2+
